# Patient Record
Sex: FEMALE | Race: WHITE | NOT HISPANIC OR LATINO | URBAN - METROPOLITAN AREA
[De-identification: names, ages, dates, MRNs, and addresses within clinical notes are randomized per-mention and may not be internally consistent; named-entity substitution may affect disease eponyms.]

---

## 2023-10-30 ENCOUNTER — ANCILLARY PROCEDURE (OUTPATIENT)
Dept: RADIOLOGY | Facility: CLINIC | Age: 58
End: 2023-10-30
Payer: COMMERCIAL

## 2023-10-30 ENCOUNTER — LAB (OUTPATIENT)
Dept: LAB | Facility: LAB | Age: 58
End: 2023-10-30
Payer: COMMERCIAL

## 2023-10-30 ENCOUNTER — OFFICE VISIT (OUTPATIENT)
Dept: PRIMARY CARE | Facility: CLINIC | Age: 58
End: 2023-10-30
Payer: COMMERCIAL

## 2023-10-30 VITALS
TEMPERATURE: 97.8 F | DIASTOLIC BLOOD PRESSURE: 80 MMHG | BODY MASS INDEX: 20.79 KG/M2 | SYSTOLIC BLOOD PRESSURE: 106 MMHG | HEIGHT: 64 IN | WEIGHT: 121.8 LBS

## 2023-10-30 DIAGNOSIS — R04.2 HEMOPTYSIS: ICD-10-CM

## 2023-10-30 DIAGNOSIS — Z12.31 ENCOUNTER FOR SCREENING MAMMOGRAM FOR MALIGNANT NEOPLASM OF BREAST: Primary | ICD-10-CM

## 2023-10-30 DIAGNOSIS — Z00.00 HEALTHCARE MAINTENANCE: ICD-10-CM

## 2023-10-30 LAB
25(OH)D3 SERPL-MCNC: 86 NG/ML (ref 30–100)
ALBUMIN SERPL BCP-MCNC: 4.4 G/DL (ref 3.4–5)
ALP SERPL-CCNC: 108 U/L (ref 33–110)
ALT SERPL W P-5'-P-CCNC: 10 U/L (ref 7–45)
ANION GAP SERPL CALC-SCNC: 12 MMOL/L (ref 10–20)
AST SERPL W P-5'-P-CCNC: 12 U/L (ref 9–39)
BILIRUB SERPL-MCNC: 0.7 MG/DL (ref 0–1.2)
BUN SERPL-MCNC: 13 MG/DL (ref 6–23)
CALCIUM SERPL-MCNC: 9.7 MG/DL (ref 8.6–10.3)
CHLORIDE SERPL-SCNC: 106 MMOL/L (ref 98–107)
CHOLEST SERPL-MCNC: 203 MG/DL (ref 0–199)
CHOLESTEROL/HDL RATIO: 3
CO2 SERPL-SCNC: 29 MMOL/L (ref 21–32)
CREAT SERPL-MCNC: 0.59 MG/DL (ref 0.5–1.05)
ERYTHROCYTE [DISTWIDTH] IN BLOOD BY AUTOMATED COUNT: 12.8 % (ref 11.5–14.5)
GFR SERPL CREATININE-BSD FRML MDRD: >90 ML/MIN/1.73M*2
GLUCOSE SERPL-MCNC: 78 MG/DL (ref 74–99)
HCT VFR BLD AUTO: 41 % (ref 36–46)
HDLC SERPL-MCNC: 67.6 MG/DL
HGB BLD-MCNC: 13.2 G/DL (ref 12–16)
LDLC SERPL CALC-MCNC: 104 MG/DL
MCH RBC QN AUTO: 29.8 PG (ref 26–34)
MCHC RBC AUTO-ENTMCNC: 32.2 G/DL (ref 32–36)
MCV RBC AUTO: 93 FL (ref 80–100)
NON HDL CHOLESTEROL: 135 MG/DL (ref 0–149)
NRBC BLD-RTO: 0 /100 WBCS (ref 0–0)
PLATELET # BLD AUTO: 316 X10*3/UL (ref 150–450)
PMV BLD AUTO: 10 FL (ref 7.5–11.5)
POTASSIUM SERPL-SCNC: 4.5 MMOL/L (ref 3.5–5.3)
PROT SERPL-MCNC: 6 G/DL (ref 6.4–8.2)
RBC # BLD AUTO: 4.43 X10*6/UL (ref 4–5.2)
SODIUM SERPL-SCNC: 142 MMOL/L (ref 136–145)
TRIGL SERPL-MCNC: 158 MG/DL (ref 0–149)
TSH SERPL-ACNC: 1.37 MIU/L (ref 0.44–3.98)
VLDL: 32 MG/DL (ref 0–40)
WBC # BLD AUTO: 7.4 X10*3/UL (ref 4.4–11.3)

## 2023-10-30 PROCEDURE — 71046 X-RAY EXAM CHEST 2 VIEWS: CPT | Performed by: RADIOLOGY

## 2023-10-30 PROCEDURE — 36415 COLL VENOUS BLD VENIPUNCTURE: CPT

## 2023-10-30 PROCEDURE — 84443 ASSAY THYROID STIM HORMONE: CPT

## 2023-10-30 PROCEDURE — 99386 PREV VISIT NEW AGE 40-64: CPT | Performed by: INTERNAL MEDICINE

## 2023-10-30 PROCEDURE — 80053 COMPREHEN METABOLIC PANEL: CPT

## 2023-10-30 PROCEDURE — 80061 LIPID PANEL: CPT

## 2023-10-30 PROCEDURE — 82306 VITAMIN D 25 HYDROXY: CPT

## 2023-10-30 PROCEDURE — 83036 HEMOGLOBIN GLYCOSYLATED A1C: CPT

## 2023-10-30 PROCEDURE — 71046 X-RAY EXAM CHEST 2 VIEWS: CPT | Mod: FY

## 2023-10-30 PROCEDURE — 85027 COMPLETE CBC AUTOMATED: CPT

## 2023-10-30 RX ORDER — IBUPROFEN 100 MG/5ML
1000 SUSPENSION, ORAL (FINAL DOSE FORM) ORAL DAILY
COMMUNITY

## 2023-10-30 RX ORDER — ACETAMINOPHEN, DIPHENHYDRAMINE HCL, PHENYLEPHRINE HCL 325; 25; 5 MG/1; MG/1; MG/1
TABLET ORAL DAILY
COMMUNITY

## 2023-10-30 RX ORDER — PLANT STANOL ESTER 450 MG
8000 TABLET ORAL DAILY
COMMUNITY

## 2023-10-30 RX ORDER — VIT C/E/ZN/COPPR/LUTEIN/ZEAXAN 250MG-90MG
25 CAPSULE ORAL DAILY
COMMUNITY

## 2023-10-30 ASSESSMENT — PATIENT HEALTH QUESTIONNAIRE - PHQ9
2. FEELING DOWN, DEPRESSED OR HOPELESS: NOT AT ALL
1. LITTLE INTEREST OR PLEASURE IN DOING THINGS: NOT AT ALL
SUM OF ALL RESPONSES TO PHQ9 QUESTIONS 1 AND 2: 0

## 2023-10-30 NOTE — PROGRESS NOTES
Subjective   Patient ID: [id number]   [facility]    Kavita Vences is a 57 y.o. female who presents for Annual Exam and Sinusitis.  She did not doctor for many years and she did not do any blood work for many years. She have h/o bloody sputum  and it is happening now more often.            Objective   Visit Vitals  /80 (BP Location: Left arm, Patient Position: Sitting, BP Cuff Size: Adult)   Temp 36.6 °C (97.8 °F) (Temporal)      Review of Systems  All 12 systems reviewed, no other abnormality except that mentioned in HPI.    Physical Exam  Constitutional- no abnormality  ENT- no abnormality  Neck- no swelling  CVS- normal S1 and S2, no murmur.  Pulmonary- clear to auscultation,no rhonchi, no wheezes.  Abdomen- normal- liver and spleen, soft, no distension, bowel sound present.  Neurological- all cranial nerves intact, speech and gait normal, no sensory or motor deficiency.  Musculoskeletal- all pulses are normal, normal movement, no joint swelling.  Skin- no rash, dry.  psychiatry- no suicidal ideation.  Lymph node- no lymphadenopathy      Assessment/Plan   Problem List Items Addressed This Visit    None  Visit Diagnoses       Encounter for screening mammogram for malignant neoplasm of breast    -  Primary    Relevant Orders    BI mammo bilateral screening tomosynthesis    Healthcare maintenance        Relevant Orders    CBC    Comprehensive Metabolic Panel    Lipid Panel    TSH with reflex to Free T4 if abnormal    Vitamin D 25-Hydroxy,Total (for eval of Vitamin D levels)    Hemoglobin A1C        She have cologuard kit to send  2. She will make an appointment for Pap smear.  3. Mammogram ordered and Blood ordered for preventative care.      Crista Coyle MD

## 2023-10-31 LAB
EST. AVERAGE GLUCOSE BLD GHB EST-MCNC: 123 MG/DL
HBA1C MFR BLD: 5.9 %

## 2023-11-01 ENCOUNTER — TELEPHONE (OUTPATIENT)
Dept: PRIMARY CARE | Facility: CLINIC | Age: 58
End: 2023-11-01
Payer: COMMERCIAL

## 2023-11-01 NOTE — TELEPHONE ENCOUNTER
----- Message from Crista Coyle MD sent at 11/1/2023 11:34 AM EDT -----  Labs reviewed, looks good, only prediabetic, need diet control and exercise.  ----- Message -----  From: Lab, Background User  Sent: 10/30/2023   4:45 PM EDT  To: Crista Coyle MD

## 2023-11-02 ENCOUNTER — TELEPHONE (OUTPATIENT)
Dept: PRIMARY CARE | Facility: CLINIC | Age: 58
End: 2023-11-02
Payer: COMMERCIAL

## 2023-11-02 NOTE — TELEPHONE ENCOUNTER
----- Message from Crista Coyle MD sent at 11/2/2023  1:32 PM EDT -----  Chest xray shows mass like lesion on the left lung field. Please make an appointment for a physical visit.  ----- Message -----  From: Interface, Radiology Results In  Sent: 11/2/2023   1:18 PM EDT  To: Crista Coyle MD

## 2023-11-03 ENCOUNTER — OFFICE VISIT (OUTPATIENT)
Dept: PRIMARY CARE | Facility: CLINIC | Age: 58
End: 2023-11-03
Payer: COMMERCIAL

## 2023-11-03 VITALS — DIASTOLIC BLOOD PRESSURE: 70 MMHG | SYSTOLIC BLOOD PRESSURE: 100 MMHG | TEMPERATURE: 97.7 F

## 2023-11-03 DIAGNOSIS — R91.8 MASS OF LEFT LUNG: Primary | ICD-10-CM

## 2023-11-03 PROCEDURE — 99213 OFFICE O/P EST LOW 20 MIN: CPT | Performed by: INTERNAL MEDICINE

## 2023-11-03 NOTE — PROGRESS NOTES
Subjective   Patient ID: [id number]   [facility]    Kavita Vences is a 57 y.o. female who presents for Results. Her chest xray shows 5 cm pass on the left lung. She came with her , she is a long time smoker. She started having hemoptysis  lately, no body weight loss.        Objective   Visit Vitals  /70   Temp 36.5 °C (97.7 °F)      Review of Systems  All 12 systems reviewed, no other abnormality except that mentioned in HPI.    Physical Exam  Constitutional- no abnormality  ENT- no abnormality  Neck- no swelling  CVS- normal S1 and S2, no murmur.  Pulmonary- clear to auscultation,no rhonchi, no wheezes.  Abdomen- normal- liver and spleen, soft, no distension, bowel sound present.  Neurological- all cranial nerves intact, speech and gait normal, no sensory or motor deficiency.  Musculoskeletal- all pulses are normal, normal movement, no joint swelling.  Skin- no rash, dry.  psychiatry- no suicidal ideation.  Lymph node- no lymphadenopathy      Assessment/Plan   Problem List Items Addressed This Visit    None  Visit Diagnoses       Mass of left lung    -  Primary    Relevant Orders    CT chest w IV contrast        Follow CT chest.    Crista Coyle MD

## 2023-11-06 ENCOUNTER — APPOINTMENT (OUTPATIENT)
Dept: PRIMARY CARE | Facility: CLINIC | Age: 58
End: 2023-11-06
Payer: COMMERCIAL

## 2023-11-10 ENCOUNTER — ANCILLARY PROCEDURE (OUTPATIENT)
Dept: RADIOLOGY | Facility: CLINIC | Age: 58
End: 2023-11-10
Payer: COMMERCIAL

## 2023-11-10 DIAGNOSIS — R91.8 MASS OF LEFT LUNG: ICD-10-CM

## 2023-11-10 PROCEDURE — 71260 CT THORAX DX C+: CPT | Performed by: STUDENT IN AN ORGANIZED HEALTH CARE EDUCATION/TRAINING PROGRAM

## 2023-11-10 PROCEDURE — 2550000001 HC RX 255 CONTRASTS: Performed by: INTERNAL MEDICINE

## 2023-11-10 PROCEDURE — 71260 CT THORAX DX C+: CPT

## 2023-11-10 RX ADMIN — IOHEXOL 64 ML: 350 INJECTION, SOLUTION INTRAVENOUS at 12:25

## 2023-11-13 ENCOUNTER — TELEPHONE (OUTPATIENT)
Dept: PRIMARY CARE | Facility: CLINIC | Age: 58
End: 2023-11-13
Payer: COMMERCIAL

## 2023-11-13 DIAGNOSIS — R91.8 LUNG MASS: Primary | ICD-10-CM

## 2023-11-13 NOTE — TELEPHONE ENCOUNTER
----- Message from Crista Coyle MD sent at 11/13/2023  4:12 PM EST -----  CT chest shows the same mass on chest xray, concerning for malignancy. I will refer to pulmonologist for further evaluation.  ----- Message -----  From: Interface, Radiology Results In  Sent: 11/11/2023  12:30 PM EST  To: Crista Coyle MD

## 2023-11-13 NOTE — TELEPHONE ENCOUNTER
Patient advised of results with understanding.  Has read them in her My Chart.  Will make an appointment with a specialist.

## 2023-11-24 ENCOUNTER — APPOINTMENT (OUTPATIENT)
Dept: RADIOLOGY | Facility: CLINIC | Age: 58
End: 2023-11-24
Payer: COMMERCIAL

## 2023-12-07 ENCOUNTER — APPOINTMENT (OUTPATIENT)
Dept: RADIOLOGY | Facility: CLINIC | Age: 58
End: 2023-12-07
Payer: COMMERCIAL